# Patient Record
Sex: MALE | Race: WHITE
[De-identification: names, ages, dates, MRNs, and addresses within clinical notes are randomized per-mention and may not be internally consistent; named-entity substitution may affect disease eponyms.]

---

## 2018-06-03 ENCOUNTER — HOSPITAL ENCOUNTER (EMERGENCY)
Dept: HOSPITAL 75 - ER | Age: 53
LOS: 1 days | Discharge: HOME | End: 2018-06-04
Payer: COMMERCIAL

## 2018-06-03 VITALS — HEIGHT: 70 IN | BODY MASS INDEX: 41.52 KG/M2 | WEIGHT: 290 LBS

## 2018-06-03 DIAGNOSIS — R42: Primary | ICD-10-CM

## 2018-06-03 DIAGNOSIS — I10: ICD-10-CM

## 2018-06-03 PROCEDURE — 80053 COMPREHEN METABOLIC PANEL: CPT

## 2018-06-03 PROCEDURE — 85025 COMPLETE CBC W/AUTO DIFF WBC: CPT

## 2018-06-03 PROCEDURE — 93005 ELECTROCARDIOGRAM TRACING: CPT

## 2018-06-03 PROCEDURE — 36415 COLL VENOUS BLD VENIPUNCTURE: CPT

## 2018-06-03 PROCEDURE — 96372 THER/PROPH/DIAG INJ SC/IM: CPT

## 2018-06-03 PROCEDURE — 85379 FIBRIN DEGRADATION QUANT: CPT

## 2018-06-04 VITALS — DIASTOLIC BLOOD PRESSURE: 87 MMHG | SYSTOLIC BLOOD PRESSURE: 126 MMHG

## 2018-06-04 LAB
ALBUMIN SERPL-MCNC: 4.6 GM/DL (ref 3.2–4.5)
ALP SERPL-CCNC: 131 U/L (ref 40–136)
ALT SERPL-CCNC: 19 U/L (ref 0–55)
BASOPHILS # BLD AUTO: 0 10^3/UL (ref 0–0.1)
BASOPHILS NFR BLD AUTO: 0 % (ref 0–10)
BILIRUB SERPL-MCNC: 0.6 MG/DL (ref 0.1–1)
BUN/CREAT SERPL: 17
CALCIUM SERPL-MCNC: 9.5 MG/DL (ref 8.5–10.1)
CHLORIDE SERPL-SCNC: 106 MMOL/L (ref 98–107)
CO2 SERPL-SCNC: 22 MMOL/L (ref 21–32)
CREAT SERPL-MCNC: 1.15 MG/DL (ref 0.6–1.3)
EOSINOPHIL # BLD AUTO: 0.1 10^3/UL (ref 0–0.3)
EOSINOPHIL NFR BLD AUTO: 1 % (ref 0–10)
ERYTHROCYTE [DISTWIDTH] IN BLOOD BY AUTOMATED COUNT: 13.7 % (ref 10–14.5)
GFR SERPLBLD BASED ON 1.73 SQ M-ARVRAT: > 60 ML/MIN
GLUCOSE SERPL-MCNC: 107 MG/DL (ref 70–105)
HCT VFR BLD CALC: 40 % (ref 40–54)
HGB BLD-MCNC: 13.6 G/DL (ref 13.3–17.7)
LYMPHOCYTES # BLD AUTO: 1.8 X 10^3 (ref 1–4)
LYMPHOCYTES NFR BLD AUTO: 17 % (ref 12–44)
MANUAL DIFFERENTIAL PERFORMED BLD QL: NO
MCH RBC QN AUTO: 30 PG (ref 25–34)
MCHC RBC AUTO-ENTMCNC: 34 G/DL (ref 32–36)
MCV RBC AUTO: 88 FL (ref 80–99)
MONOCYTES # BLD AUTO: 0.7 X 10^3 (ref 0–1)
MONOCYTES NFR BLD AUTO: 6 % (ref 0–12)
NEUTROPHILS # BLD AUTO: 8.1 X 10^3 (ref 1.8–7.8)
NEUTROPHILS NFR BLD AUTO: 76 % (ref 42–75)
PLATELET # BLD: 237 10^3/UL (ref 130–400)
PMV BLD AUTO: 10.9 FL (ref 7.4–10.4)
POTASSIUM SERPL-SCNC: 4.2 MMOL/L (ref 3.6–5)
PROT SERPL-MCNC: 7.8 GM/DL (ref 6.4–8.2)
RBC # BLD AUTO: 4.52 10^6/UL (ref 4.35–5.85)
SODIUM SERPL-SCNC: 141 MMOL/L (ref 135–145)
WBC # BLD AUTO: 10.7 10^3/UL (ref 4.3–11)

## 2018-06-04 NOTE — ED SYNCOPE
General


Chief Complaint:  Dizziness/Syncope


Stated Complaint:  DIZZY N/V/D


Nursing Triage Note:  


Patient advises he began experiencing dizziness and vomitting x 3 hrs ago that 


has become progressively worse.


Source of Information:  Patient


Exam Limitations:  No Limitations


 (WILLY ARRINGTON)





History of Present Illness


Date Seen by Provider:  Jun 4, 2018


Time Seen by Provider:  01:00


Initial Comments


Patient is a 52-year-old male who presents to the emergency room with 

complaints of dizziness that started this evening. He reports that the 

dizziness started when he was lying on the couch watching TV, he states that 

laying his head back on a pillow  makes the dizziness worse. When the patient 

stands up the dizziness resolves. He denies any shortness of breath, chest pain

, nausea, vomiting.


Timing/Prior Episodes:  No Prior History


Symptoms Prior to Episode:  None, Lightheadedness


Precipitating Factors:  Other (lying)


Loss of Consciousness:  No Loss of Consciousness (lying down)


Current Symptoms:  Dizziness (WILLY ARRINGTON)


Current Symptoms:  No Chest Pain; Nausea (BRIGIDO PARIKH MD)





Allergies and Home Medications


Allergies


Coded Allergies:  


     No Known Drug Allergies (Unverified , 6/4/18)





Patient Home Medication List


Home Medication List Reviewed:  Yes


 (WILLY ARRINGTON)


Home Medication List Reviewed:  Yes


 (BRIGIDO PARIKH MD)





Constitutional:  see HPI, dizziness


EENTM:  no symptoms reported; No ear pain, No blurred vision, No double vision


Respiratory:  no symptoms reported


Cardiovascular:  see HPI; No chest pain, No edema, No Hx of Intervention; 

palpitations


Gastrointestinal:  no symptoms reported; No abdominal pain, No nausea, No 

vomiting


Genitourinary:  no symptoms reported; No dysuria


Musculoskeletal:  no symptoms reported; No back pain


Skin:  no symptoms reported, see HPI


Psychiatric/Neurological:  No Symptoms Reported; Denies Anxiety (WILLY ARRINGTON)


Constitutional:  see HPI, dizziness; No fever


Gastrointestinal:  nausea, vomiting (BRIGIDO PARIKH MD)





Past Medical-Social-Family Hx


Past Med/Social Hx:  Reviewed Nursing Past Med/Soc Hx


 (WILLY ARRINGTON)


Past Med/Social Hx:  Reviewed Nursing Past Med/Soc Hx


 (BRIGIDO PARIKH MD)


Patient Social History


Alcohol Use:  Denies Use


Recreational Drug Use:  No


Smoking Status:  Never a Smoker


Recent Foreign Travel:  No


Contact w/Someone Who Travel:  No


Recent Infectious Disease Expo:  No


Recent Hopitalizations:  No


Physical Abuse:  No


Sexual Abuse:  No


 (WILLY ARRINGTON STUDENT)





Seasonal Allergies


Seasonal Allergies:  No


 (WILLY ARRINGTON STUDENT)





Past Medical History


Surgeries:  No


Respiratory:  No


Cardiac:  Yes (PVC's)


Hypertension


Genitourinary:  No


Gastrointestinal:  No


Musculoskeletal:  No


Endocrine:  No


HEENT:  No


Cancer:  No


Psychosocial:  No


Nursing Suicide Risk Score:  0


Integumentary:  No


Blood Disorders:  No


 (WILLY ARRINGTON STUDENT)





Family Medical History


Reviewed Nursing Family Hx


 (WILLY ARRINGTON STUDENT)


Reviewed Nursing Family Hx


 (BRIGIDO PARIKH MD)





Physical Exam


Vital Signs





Vital Signs - First Documented








 6/4/18





 00:58


 


Temp 96.2


 


Pulse 80


 


Resp 14


 


B/P (MAP) 144/94 (111)


 


Pulse Ox 95


 


O2 Delivery Room Air








 (BRIGIDO PARIKH MD)


Vital Signs


Capillary Refill : Less Than 3 Seconds 


 (WILLY ARRINGTON STUDENT)


General Appearance:  No Apparent Distress, WD/WN


HEENT:  PERRL/EOMI, TMs Normal, Normal ENT Inspection; No Photophobia


Neck:  Full Range of Motion, Normal Inspection, Non Tender


Cardiovascular:  Regular Rate, Rhythm, No Edema, Normal Peripheral Pulses


Respiratory:  Chest Non Tender, Lungs Clear, Normal Breath Sounds


Gastrointestinal:  Normal Bowel Sounds, No Organomegaly


Back:  Normal Inspection, No CVA Tenderness


Extremities:  Normal Inspection, Normal Range of Motion


Neurologic/Psychiatric:  Alert, Oriented x3, No Motor/Sensory Deficits, Normal 

Mood/Affect, CNs II-XII Norm as Tested; No Facial Droop


Cranial Nerves:  Normal Hearing, Normal Speech


Coordination/Gait:  Normal Finger to Nose, Normal Gait, Negative Romberg's Sign


Motor/Sensory:  No Motor Deficit, No Sensory Deficit, No Pronator Drift


Skin:  Normal Color, Warm/Dry


Lymphatic:  No Adenopathy (WILLY ARRINGTON STUDENT)


HEENT:  TMs Normal, Pharynx Normal


Cardiovascular:  Regular Rate, Rhythm, No Murmur


Respiratory:  Lungs Clear, Normal Breath Sounds


Gastrointestinal:  Non Tender, Soft (BRIGIDO PARIKH MD)





Progress/Results/Core Measures


Results/Orders


Lab Results





Laboratory Tests








Test


 6/4/18


00:55 Range/Units


 


 


White Blood Count


 10.7 


 4.3-11.0


10^3/uL


 


Red Blood Count


 4.52 


 4.35-5.85


10^6/uL


 


Hemoglobin 13.6  13.3-17.7  G/DL


 


Hematocrit 40  40-54  %


 


Mean Corpuscular Volume 88  80-99  FL


 


Mean Corpuscular Hemoglobin 30  25-34  PG


 


Mean Corpuscular Hemoglobin


Concent 34 


 32-36  G/DL





 


Red Cell Distribution Width 13.7  10.0-14.5  %


 


Platelet Count


 237 


 130-400


10^3/uL


 


Mean Platelet Volume 10.9 H 7.4-10.4  FL


 


Neutrophils (%) (Auto) 76 H 42-75  %


 


Lymphocytes (%) (Auto) 17  12-44  %


 


Monocytes (%) (Auto) 6  0-12  %


 


Eosinophils (%) (Auto) 1  0-10  %


 


Basophils (%) (Auto) 0  0-10  %


 


Neutrophils # (Auto) 8.1 H 1.8-7.8  X 10^3


 


Lymphocytes # (Auto) 1.8  1.0-4.0  X 10^3


 


Monocytes # (Auto) 0.7  0.0-1.0  X 10^3


 


Eosinophils # (Auto)


 0.1 


 0.0-0.3


10^3/uL


 


Basophils # (Auto)


 0.0 


 0.0-0.1


10^3/uL


 


D-Dimer


 0.48 


 0.00-0.49


UG/ML


 


Sodium Level 141  135-145  MMOL/L


 


Potassium Level 4.2  3.6-5.0  MMOL/L


 


Chloride Level 106    MMOL/L


 


Carbon Dioxide Level 22  21-32  MMOL/L


 


Anion Gap 13  5-14  MMOL/L


 


Blood Urea Nitrogen 19 H 7-18  MG/DL


 


Creatinine


 1.15 


 0.60-1.30


MG/DL


 


Estimat Glomerular Filtration


Rate > 60 


  





 


BUN/Creatinine Ratio 17   


 


Glucose Level 107 H   MG/DL


 


Calcium Level 9.5  8.5-10.1  MG/DL


 


Total Bilirubin 0.6  0.1-1.0  MG/DL


 


Aspartate Amino Transf


(AST/SGOT) 15 


 5-34  U/L





 


Alanine Aminotransferase


(ALT/SGPT) 19 


 0-55  U/L





 


Alkaline Phosphatase 131    U/L


 


Total Protein 7.8  6.4-8.2  GM/DL


 


Albumin 4.6 H 3.2-4.5  GM/DL





 (BRIGIDO PARIKH MD)


My Orders





Orders - BRIGIDO PARIKH MD


Cbc With Automated Diff (6/4/18 00:55)


Comprehensive Metabolic Panel (6/4/18 00:55)


Fibrin Degradation Products (6/4/18 00:55)


Ekg Tracing (6/4/18 00:57)


Di Iv Start (Assessment) .on IV start (6/4/18 00:57)


Meclizine Tablet (Antivert Tablet) (6/4/18 01:15)


Dexamethasone Injection (Decadron Inject (6/4/18 02:00)


 (BRIGIDO PARIKH MD)


Medications Given in ED





Current Medications








 Medications  Dose


 Ordered  Sig/Henry


 Route  Start Time


 Stop Time Status Last Admin


Dose Admin


 


 Dexamethasone


 Sodium Phosphate  10 mg  ONCE  ONCE


 IM  6/4/18 02:00


 6/4/18 02:01 DC 6/4/18 02:05


10 MG


 


 Meclizine HCl  25 mg  ONCE  ONCE


 PO  6/4/18 01:15


 6/4/18 01:16 DC 6/4/18 01:19


25 MG





 (BRIGIDO PARIKH MD)


Vital Signs/I&O











 6/4/18





 00:58


 


Temp 96.2


 


Pulse 80


 


Resp 14


 


B/P (MAP) 144/94 (111)


 


Pulse Ox 95


 


O2 Delivery Room Air





 (BRIGIDO PARIKH MD)








Blood Pressure Mean:  111











Progress


Progress Note :  


   Time:  02:27


Progress Note


Patient reports feeling much better at this time, after the administration of 

meclizine and Decadron he is able to move his head without becoming dizzy. He 

states that he feels well enough to go home at this time. We discussed the 

plans for continuing the meclizine over-the-counter 25 mg every 8 hours as 

needed for dizziness and the importance of increasing his fluid intake.


 (WILLY ARRINGTON STUDENT)


Progress Note :  


Progress Note


Have seen and evaluated the patient and agree with above except as indicated.  

I have dictated the plan of care.  Patient here with dizziness after playing 

down.  States it's better when he sits not.  He did have vomiting episode with 

his initial dizziness.  He has not had anything like this before.  Denies chest 

pain or breathing problems.  Patient given meclizine and then Decadron and 

heaves much better afterwards.  Discharged home with return precautions.  

Patient verbalize understanding instructions and agreement with plan.


 (BRIGIDO PARIKH MD)





Departure


Impression





 Primary Impression:  


 Vertigo


Disposition:  01 HOME, SELF-CARE


Condition:  Improved





Departure-Patient Inst.


Decision time for Depature:  02:29


 (WILLY ARRINGTON STUDENT)


Patient Instructions:  Vertigo (a Type of Dizziness) (DC)





Add. Discharge Instructions:  


Meclizine/Antivert is now sold over-the-counter, go to the pharmacy and pick 

this up and you may take 1 tab every 8 hours as needed for dizziness. Follow-up 

with your doctor within 1 week, call tomorrow morning for an appointment time. 

Return back to the emergency room for increased dizziness, nausea, vomiting, 

shortness of breath, chest pain, and/or any other concerns as needed. All 

discharge instructions reviewed with patient and/or family. Voiced 

understanding.











WILLY ARRINGTON STUDENT Jun 4, 2018 01:45


BRIGIDO PARIKH MD Jun 4, 2018 02:53